# Patient Record
Sex: FEMALE | URBAN - METROPOLITAN AREA
[De-identification: names, ages, dates, MRNs, and addresses within clinical notes are randomized per-mention and may not be internally consistent; named-entity substitution may affect disease eponyms.]

---

## 2023-06-21 ENCOUNTER — APPOINTMENT (OUTPATIENT)
Dept: URBAN - METROPOLITAN AREA CLINIC 204 | Age: 33
Setting detail: DERMATOLOGY
End: 2023-06-21

## 2023-06-21 DIAGNOSIS — L21.8 OTHER SEBORRHEIC DERMATITIS: ICD-10-CM

## 2023-06-21 PROCEDURE — OTHER PRESCRIPTION MEDICATION MANAGEMENT: OTHER

## 2023-06-21 PROCEDURE — 99204 OFFICE O/P NEW MOD 45 MIN: CPT

## 2023-06-21 PROCEDURE — OTHER COUNSELING: OTHER

## 2023-06-21 PROCEDURE — OTHER PRESCRIPTION: OTHER

## 2023-06-21 PROCEDURE — OTHER MIPS QUALITY: OTHER

## 2023-06-21 RX ORDER — FLUOCINOLONE ACETONIDE 0.11 MG/ML
OIL TOPICAL
Qty: 118.28 | Refills: 3 | Status: ERX | COMMUNITY
Start: 2023-06-21

## 2023-06-21 RX ORDER — KETOCONAZOLE 20 MG/ML
SHAMPOO, SUSPENSION TOPICAL
Qty: 120 | Refills: 3 | Status: ERX | COMMUNITY
Start: 2023-06-21

## 2023-06-21 ASSESSMENT — LOCATION ZONE DERM: LOCATION ZONE: SCALP

## 2023-06-21 ASSESSMENT — LOCATION SIMPLE DESCRIPTION DERM: LOCATION SIMPLE: POSTERIOR SCALP

## 2023-06-21 ASSESSMENT — LOCATION DETAILED DESCRIPTION DERM: LOCATION DETAILED: MID-OCCIPITAL SCALP

## 2023-06-21 NOTE — PROCEDURE: COUNSELING
Topical Steroid Recommendations: DermaSmoothe Oil
Detail Level: Detailed
Shampoo Recommendations: Ketoconazole and T-Sal Shampoo

## 2023-06-21 NOTE — PROCEDURE: PRESCRIPTION MEDICATION MANAGEMENT
Detail Level: Zone
Plan: Follow up in 6 weeks.
Render In Strict Bullet Format?: No
Initiate Treatment: Mix Ketoconazole shampoo with T-Sal shampoo and use 3-4 times per week. Also recommend using DermaSmoothe oil treatment every other night until clear. Once clear, can use once a week.

## 2023-06-21 NOTE — HPI: RASH
Is The Patient Presenting As Previously Scheduled?: Yes
Is This A New Presentation, Or A Follow-Up?: Rash
Additional History: Pt states that she does not have a personal or family history of psoriasis. She says that this has been going on for several years and that she does not have a h/o diabetes and was not sick around the time this started.

## 2023-08-04 ENCOUNTER — APPOINTMENT (OUTPATIENT)
Dept: URBAN - METROPOLITAN AREA CLINIC 204 | Age: 33
Setting detail: DERMATOLOGY
End: 2023-08-04

## 2023-08-04 DIAGNOSIS — L21.8 OTHER SEBORRHEIC DERMATITIS: ICD-10-CM

## 2023-08-04 PROCEDURE — OTHER COUNSELING: OTHER

## 2023-08-04 PROCEDURE — 99213 OFFICE O/P EST LOW 20 MIN: CPT

## 2023-08-04 PROCEDURE — OTHER PRESCRIPTION MEDICATION MANAGEMENT: OTHER

## 2023-08-04 PROCEDURE — OTHER MIPS QUALITY: OTHER

## 2023-08-04 ASSESSMENT — LOCATION SIMPLE DESCRIPTION DERM: LOCATION SIMPLE: POSTERIOR SCALP

## 2023-08-04 ASSESSMENT — LOCATION ZONE DERM: LOCATION ZONE: SCALP

## 2023-08-04 ASSESSMENT — LOCATION DETAILED DESCRIPTION DERM: LOCATION DETAILED: MID-OCCIPITAL SCALP

## 2023-08-04 NOTE — PROCEDURE: PRESCRIPTION MEDICATION MANAGEMENT
Detail Level: Zone
Continue Regimen: continue to wash scalp with ketoconazole shampoo and Tsal shampoo mix 3x a week. Use dermasmoothe oil once a week or every 2 weeks.
Render In Strict Bullet Format?: No

## 2025-01-17 ENCOUNTER — APPOINTMENT (OUTPATIENT)
Dept: URBAN - METROPOLITAN AREA CLINIC 204 | Age: 35
Setting detail: DERMATOLOGY
End: 2025-01-17

## 2025-01-17 DIAGNOSIS — L91.8 OTHER HYPERTROPHIC DISORDERS OF THE SKIN: ICD-10-CM

## 2025-01-17 DIAGNOSIS — L21.8 OTHER SEBORRHEIC DERMATITIS: ICD-10-CM

## 2025-01-17 DIAGNOSIS — D22 MELANOCYTIC NEVI: ICD-10-CM

## 2025-01-17 DIAGNOSIS — L90.8 OTHER ATROPHIC DISORDERS OF SKIN: ICD-10-CM

## 2025-01-17 PROBLEM — D22.39 MELANOCYTIC NEVI OF OTHER PARTS OF FACE: Status: ACTIVE | Noted: 2025-01-17

## 2025-01-17 PROCEDURE — OTHER COUNSELING: OTHER

## 2025-01-17 PROCEDURE — OTHER MIPS QUALITY: OTHER

## 2025-01-17 PROCEDURE — OTHER PRESCRIPTION: OTHER

## 2025-01-17 PROCEDURE — OTHER PRESCRIPTION MEDICATION MANAGEMENT: OTHER

## 2025-01-17 PROCEDURE — 99213 OFFICE O/P EST LOW 20 MIN: CPT

## 2025-01-17 PROCEDURE — OTHER ADDITIONAL NOTES: OTHER

## 2025-01-17 RX ORDER — KETOCONAZOLE 20 MG/ML
SHAMPOO, SUSPENSION TOPICAL
Qty: 120 | Refills: 5 | Status: ERX | COMMUNITY
Start: 2025-01-17

## 2025-01-17 RX ORDER — TRETIONIN 0.25 MG/G
CREAM TOPICAL
Qty: 45 | Refills: 1 | Status: ERX | COMMUNITY
Start: 2025-01-17

## 2025-01-17 ASSESSMENT — LOCATION SIMPLE DESCRIPTION DERM
LOCATION SIMPLE: RIGHT CHEEK
LOCATION SIMPLE: POSTERIOR SCALP
LOCATION SIMPLE: CHIN
LOCATION SIMPLE: LEFT CLAVICULAR SKIN

## 2025-01-17 ASSESSMENT — LOCATION DETAILED DESCRIPTION DERM
LOCATION DETAILED: LEFT CLAVICULAR SKIN
LOCATION DETAILED: RIGHT CHIN
LOCATION DETAILED: RIGHT INFERIOR MEDIAL MALAR CHEEK
LOCATION DETAILED: MID-OCCIPITAL SCALP

## 2025-01-17 ASSESSMENT — LOCATION ZONE DERM
LOCATION ZONE: TRUNK
LOCATION ZONE: FACE
LOCATION ZONE: SCALP

## 2025-01-17 NOTE — PROCEDURE: ADDITIONAL NOTES
Render Risk Assessment In Note?: yes
Detail Level: Detailed
Additional Notes: If lesion becomes symptomatic, can schedule a shave removal.
Additional Notes: Light cryogen treatment today.

## 2025-01-17 NOTE — PROCEDURE: PRESCRIPTION MEDICATION MANAGEMENT
Detail Level: Zone
Continue Regimen: continue to wash scalp with ketoconazole shampoo and Tsal shampoo mix 3x a week.
Render In Strict Bullet Format?: No
Initiate Treatment: Tretinoin 0.025% apply a small amount once daily.

## 2025-01-17 NOTE — PROCEDURE: COUNSELING
Detail Level: Detailed
Topical Steroid Recommendations: DermaSmoothe Oil
Shampoo Recommendations: Ketoconazole and T-Sal Shampoo
Detail Level: Zone